# Patient Record
Sex: MALE | Race: ASIAN | NOT HISPANIC OR LATINO | Employment: PART TIME | URBAN - METROPOLITAN AREA
[De-identification: names, ages, dates, MRNs, and addresses within clinical notes are randomized per-mention and may not be internally consistent; named-entity substitution may affect disease eponyms.]

---

## 2017-03-15 ENCOUNTER — OFFICE VISIT - HEALTHEAST (OUTPATIENT)
Dept: FAMILY MEDICINE | Facility: CLINIC | Age: 32
End: 2017-03-15

## 2017-03-15 DIAGNOSIS — M54.50 LOW BACK PAIN: ICD-10-CM

## 2017-03-15 DIAGNOSIS — M41.26 OTHER IDIOPATHIC SCOLIOSIS, LUMBAR REGION: ICD-10-CM

## 2017-03-15 DIAGNOSIS — S43.201A STERNOCLAVICULAR JOINT SUBLUXATION, RIGHT, INITIAL ENCOUNTER: ICD-10-CM

## 2017-03-15 ASSESSMENT — MIFFLIN-ST. JEOR: SCORE: 1486.03

## 2018-01-31 ENCOUNTER — OFFICE VISIT - HEALTHEAST (OUTPATIENT)
Dept: FAMILY MEDICINE | Facility: CLINIC | Age: 33
End: 2018-01-31

## 2018-01-31 DIAGNOSIS — B35.6 TINEA CRURIS: ICD-10-CM

## 2018-09-14 ENCOUNTER — OFFICE VISIT - HEALTHEAST (OUTPATIENT)
Dept: FAMILY MEDICINE | Facility: CLINIC | Age: 33
End: 2018-09-14

## 2018-09-14 DIAGNOSIS — Z11.3 SCREEN FOR STD (SEXUALLY TRANSMITTED DISEASE): ICD-10-CM

## 2018-09-14 DIAGNOSIS — R05.9 COUGH: ICD-10-CM

## 2018-09-14 LAB
ALBUMIN UR-MCNC: NEGATIVE MG/DL
APPEARANCE UR: CLEAR
BACTERIA #/AREA URNS HPF: NORMAL HPF
BILIRUB UR QL STRIP: NEGATIVE
COLOR UR AUTO: YELLOW
GLUCOSE UR STRIP-MCNC: NEGATIVE MG/DL
HBV SURFACE AG SERPL QL IA: NEGATIVE
HGB UR QL STRIP: NEGATIVE
HIV 1+2 AB+HIV1 P24 AG SERPL QL IA: NEGATIVE
KETONES UR STRIP-MCNC: NEGATIVE MG/DL
LEUKOCYTE ESTERASE UR QL STRIP: NEGATIVE
NITRATE UR QL: NEGATIVE
PH UR STRIP: 6 [PH] (ref 5–8)
RBC #/AREA URNS AUTO: NORMAL HPF
SP GR UR STRIP: 1.02 (ref 1–1.03)
SQUAMOUS #/AREA URNS AUTO: NORMAL LPF
T PALLIDUM AB SER QL: NEGATIVE
UROBILINOGEN UR STRIP-ACNC: NORMAL
WBC #/AREA URNS AUTO: NORMAL HPF

## 2018-09-14 ASSESSMENT — MIFFLIN-ST. JEOR: SCORE: 1500.49

## 2018-09-17 LAB
C TRACH DNA SPEC QL PROBE+SIG AMP: NEGATIVE
N GONORRHOEA DNA SPEC QL NAA+PROBE: NEGATIVE
N GONORRHOEA DNA SPEC QL NAA+PROBE: NEGATIVE

## 2018-09-20 ENCOUNTER — COMMUNICATION - HEALTHEAST (OUTPATIENT)
Dept: FAMILY MEDICINE | Facility: CLINIC | Age: 33
End: 2018-09-20

## 2019-01-09 ENCOUNTER — OFFICE VISIT - HEALTHEAST (OUTPATIENT)
Dept: FAMILY MEDICINE | Facility: CLINIC | Age: 34
End: 2019-01-09

## 2019-01-09 DIAGNOSIS — Z23 NEED FOR VACCINATION: ICD-10-CM

## 2019-01-09 DIAGNOSIS — J02.9 SORETHROAT: ICD-10-CM

## 2019-01-09 DIAGNOSIS — J35.8 TONSILLOLITH: ICD-10-CM

## 2019-01-09 LAB — DEPRECATED S PYO AG THROAT QL EIA: NORMAL

## 2019-01-09 ASSESSMENT — MIFFLIN-ST. JEOR: SCORE: 1522.62

## 2019-01-10 LAB — GROUP A STREP BY PCR: NORMAL

## 2019-01-18 ENCOUNTER — AMBULATORY - HEALTHEAST (OUTPATIENT)
Dept: FAMILY MEDICINE | Facility: CLINIC | Age: 34
End: 2019-01-18

## 2019-01-24 ENCOUNTER — OFFICE VISIT - HEALTHEAST (OUTPATIENT)
Dept: FAMILY MEDICINE | Facility: CLINIC | Age: 34
End: 2019-01-24

## 2019-01-24 DIAGNOSIS — R05.9 COUGH: ICD-10-CM

## 2019-01-24 DIAGNOSIS — Z00.00 ANNUAL PHYSICAL EXAM: ICD-10-CM

## 2019-01-24 ASSESSMENT — MIFFLIN-ST. JEOR: SCORE: 1533.44

## 2019-04-01 ENCOUNTER — OFFICE VISIT - HEALTHEAST (OUTPATIENT)
Dept: FAMILY MEDICINE | Facility: CLINIC | Age: 34
End: 2019-04-01

## 2019-04-01 DIAGNOSIS — H11.001 PTERYGIUM EYE, RIGHT: ICD-10-CM

## 2019-04-01 DIAGNOSIS — M54.50 BILATERAL LOW BACK PAIN WITHOUT SCIATICA: ICD-10-CM

## 2019-04-01 RX ORDER — CYCLOBENZAPRINE HCL 5 MG
10 TABLET ORAL EVERY 8 HOURS PRN
Qty: 60 TABLET | Refills: 1 | Status: SHIPPED | OUTPATIENT
Start: 2019-04-01 | End: 2023-01-23

## 2019-04-01 RX ORDER — LIDOCAINE 50 MG/G
OINTMENT TOPICAL
Qty: 35.44 G | Refills: 2 | Status: SHIPPED | OUTPATIENT
Start: 2019-04-01 | End: 2023-01-23

## 2019-04-01 RX ORDER — IBUPROFEN 600 MG/1
600 TABLET, FILM COATED ORAL EVERY 6 HOURS PRN
Qty: 100 TABLET | Refills: 1 | Status: SHIPPED | OUTPATIENT
Start: 2019-04-01 | End: 2023-01-23

## 2019-04-01 ASSESSMENT — MIFFLIN-ST. JEOR: SCORE: 1541.38

## 2019-04-03 ENCOUNTER — COMMUNICATION - HEALTHEAST (OUTPATIENT)
Dept: FAMILY MEDICINE | Facility: CLINIC | Age: 34
End: 2019-04-03

## 2019-07-22 ENCOUNTER — OFFICE VISIT - HEALTHEAST (OUTPATIENT)
Dept: FAMILY MEDICINE | Facility: CLINIC | Age: 34
End: 2019-07-22

## 2019-07-22 DIAGNOSIS — H11.001 PTERYGIUM EYE, RIGHT: ICD-10-CM

## 2019-07-22 DIAGNOSIS — R09.A2 GLOBUS SENSATION: ICD-10-CM

## 2019-07-22 LAB — DEPRECATED S PYO AG THROAT QL EIA: NORMAL

## 2019-07-22 ASSESSMENT — MIFFLIN-ST. JEOR: SCORE: 1527.77

## 2019-07-23 LAB — GROUP A STREP BY PCR: NORMAL

## 2019-11-13 ENCOUNTER — COMMUNICATION - HEALTHEAST (OUTPATIENT)
Dept: FAMILY MEDICINE | Facility: CLINIC | Age: 34
End: 2019-11-13

## 2021-05-27 NOTE — TELEPHONE ENCOUNTER
[facility]    Medication Name: Lidocaine 5% Ointment    Insurance Plan: [facility]   PBM:    Patient ID Number: [id number]    Please start PA process

## 2021-05-27 NOTE — PROGRESS NOTES
ASSESMENT AND PLAN:  1. Bilateral low back pain without sciatica  - R>L.  Intermittent achy pain at 5-6/10 w/o radiation x 3 months. Pain worsen with crouching or standing long hours.  Massages helps. No red flags. Most consistent with Muscle sprain and spasms.  -  Encourage massages, warm packs and NSAIDs; take with food to avoid GI upset.  -  Follow up if symptoms not better or worsens.    Orders:    - ibuprofen (ADVIL,MOTRIN) 600 MG tablet; Take 1 tablet (600 mg total) by mouth every 6 (six) hours as needed for pain or fever.  Dispense: 100 tablet; Refill: 1   - lidocaine (XYLOCAINE) 5 % ointment; Apply to affected area 2 times day as needed.  Dispense: 35.44 g; Refill: 2   - cyclobenzaprine (FLEXERIL) 5 MG tablet; Take 2 tablets (10 mg total) by mouth every 8 (eight) hours as needed for muscle spasms.  Dispense: 60 tablet; Refill: 1    2. Pterygium eye, right  -  Not obscuring vision. Eyes feel dry and irritated after focusing for long periods.  -  Denies eye pain, itching, vision changes/loss.  -  Will continue to monitor.    Orders:   - hypromellose (ARTIFICIAL TEARS,[id number],) Drop; Instill 1-2 drops in each eye as needed for dry eyes or eye irritation.  Dispense: 30 mL; Refill: 11     Reviewed Medical/Social history and Medications.  See new changes above.   Discussed indications for emergent medical attention and routine F/u.  Patient/Parent/Guardian engaged in decision making process and verbalized understanding of the options discussed and agreed with the final treatment plan.     SUBJECTIVE:  Yogi Sol is a 33 y.o. male who presents  for evaluation of his Lower back pain x 3 months.     Right side seems worse than Left.  Pain is 5-6/10, intermittent,achy and dull and w/o radiation . Pain starts when crouching or standing for long periods.  Pt reports he does a lot of crouching at work and noticed his back started hurting.   He has tried Tylenol w/o much relief.  Massages helps with soreness and  "tight muscles. Pt also works out a lot and has done back exercises but not helping.     Denies fever/chills, paresthesia, caudal equina sxs.  Exam shows no vertebral tenderness.  Mild Right paravertebral tenderness but feels better when palpated. Most consistent with muscle strain and spasms.     ROS:  Comprehensive Review of Systems Negative except stated in HPI.     Past Medical History:   Diagnosis Date     Tonsillolith 1/9/2019    Left side, small.  Resolved on 1/9/19.      Patient Active Problem List   Diagnosis     Allergic Rhinitis     Insomnia     Major Depression, Single Episode     Lower Back Pain     Costochondritis (Tietze's Syndrome)     Current Outpatient Medications   Medication Sig Dispense Refill     acetaminophen (PAIN AND FEVER) 500 MG tablet Take 2 tablets (1,000 mg total) by mouth every 6 (six) hours as needed for pain. Take 2 tablets by mouth 4 times daily as needed 60 tablet 11     cyclobenzaprine (FLEXERIL) 5 MG tablet Take 2 tablets (10 mg total) by mouth every 8 (eight) hours as needed for muscle spasms. 60 tablet 1     fluticasone (FLONASE) 50 mcg/actuation nasal spray 2 sprays into each nostril daily.       hypromellose (ARTIFICIAL TEARS,[id number],) Drop Instill 1-2 drops in each eye as needed for dry eyes or eye irritation. 30 mL 11     ibuprofen (ADVIL,MOTRIN) 600 MG tablet Take 1 tablet (600 mg total) by mouth every 6 (six) hours as needed for pain or fever. 100 tablet 1     lidocaine (XYLOCAINE) 5 % ointment Apply to affected area 2 times day as needed. 35.44 g 2     multivit-iron-min-FA-lutein 18-0.4-250 mg-mg-mcg Tab Take 1 tablet by mouth daily. 30 tablet 11     No current facility-administered medications for this visit.      Social History     Tobacco Use   Smoking Status Never Smoker   Smokeless Tobacco Never Used     OBJECTIVE: /72   Pulse 74   Temp 97.7  F (36.5  C) (Oral)   Resp 18   Ht 5' 6.2\" (1.681 m)   Wt 144 lb 8 oz (65.5 kg)   SpO2 98%   BMI 23.18 kg/m   "   No results found for this or any previous visit (from the past 24 hour(s)).    PHYSICAL:  General Alert, awake, not in acute distress.   HEENT:             -Head Atraumatic, normocephalic.            -Eyes PERRL, no erythema, discharge, conjunctiva clear. Mild medial pterygium of right eye. No obstruction.            -Ears TMs intact, no drainage, no erythema or edema.            -Nose    Nostrils patent,  no edema.            -Throat Oropharynx without edema, erythema.  Uvula midline, no deviation.             -Neck Neck FROM, no adenopathy.  Thyroid not visibly enlarged.   CV Normal S1 & S2. No murmurs.   RESP Non-labored, RRR, CTAB. No wheezes or crackles.    ABDOMEN Soft,non-tender. No rigidity, guarding.  Normal bowel sounds.    BACK No vertebral tenderness, step offs. Normal curvature.  Mild Right paravertebral tenderness; better with palpation.        Hua Rubio PA-C

## 2021-05-30 VITALS — HEIGHT: 66 IN | BODY MASS INDEX: 21.38 KG/M2 | WEIGHT: 133 LBS

## 2021-05-30 NOTE — PROGRESS NOTES
ASSESMENT AND PLAN:  1. Globus sensation  -  Reports globus sensation which started 2 weeks ago. Denies pain or dysphagia.   -  Rapid Strep: Negative; results given.   -  Follow up if symptoms not better or worsens.  May consider referral to ENT or GI if not resolved.    Orders:   - Rapid Strep A Screen- Throat Swab   - phenol-phenolate sodium (CHLORASEPTIC) 1.4 % SprA; Apply 2 sprays to the mouth or throat every 4 (four) hours as needed.  Dispense: 177 mL; Refill: 0  - Group A Strep, RNA Direct Detection, Throat    2. Pterygium eye, right  - Controlled on current tx. Refill request.   Orders:   - hypromellose (ARTIFICIAL TEARS,[id number],) Drop; Instill 1-2 drops in each eye as needed for dry eyes or eye irritation.  Dispense: 30 mL; Refill: 11     Reviewed Medical/Social history and Medications. See new changes above.   Discussed indications for emergent medical attention and routine F/u.  Patient/Parent/Guardian engaged in decision making process and verbalized understanding of the options discussed and agreed with the final treatment plan.     SUBJECTIVE:  Yogi Sol is a 33 y.o. male who presents  for evaluation of his throat problem.     Reports sensation of food  stuck in back of throat x 2 weeks.  Denies dysphagia or pain,  fever/chills, wheezing, SOB, n/v, cough, rhinorrhea.    ROS:  Comprehensive Review of Systems Negative except stated in HPI.     Past Medical History:   Diagnosis Date     Tonsillolith 1/9/2019    Left side, small.  Resolved on 1/9/19.      Patient Active Problem List   Diagnosis     Allergic Rhinitis     Insomnia     Major Depression, Single Episode     Lower Back Pain     Costochondritis (Tietze's Syndrome)     Current Outpatient Medications   Medication Sig Dispense Refill     hypromellose (ARTIFICIAL TEARS,[id number],) Drop Instill 1-2 drops in each eye as needed for dry eyes or eye irritation. 30 mL 11     acetaminophen (PAIN AND FEVER) 500 MG tablet Take 2 tablets (1,000 mg  "total) by mouth every 6 (six) hours as needed for pain. Take 2 tablets by mouth 4 times daily as needed 60 tablet 11     cyclobenzaprine (FLEXERIL) 5 MG tablet Take 2 tablets (10 mg total) by mouth every 8 (eight) hours as needed for muscle spasms. (Patient not taking: Reported on 7/22/2019      ) 60 tablet 1     fluticasone (FLONASE) 50 mcg/actuation nasal spray 2 sprays into each nostril daily.              ibuprofen (ADVIL,MOTRIN) 600 MG tablet Take 1 tablet (600 mg total) by mouth every 6 (six) hours as needed for pain or fever. 100 tablet 1     lidocaine (XYLOCAINE) 5 % ointment Apply to affected area 2 times day as needed. (Patient not taking: Reported on 7/22/2019      ) 35.44 g 2     multivit-iron-min-FA-lutein 18-0.4-250 mg-mg-mcg Tab Take 1 tablet by mouth daily. 30 tablet 11     phenol-phenolate sodium (CHLORASEPTIC) 1.4 % SprA Apply 2 sprays to the mouth or throat every 4 (four) hours as needed. 177 mL 0     No current facility-administered medications for this visit.      Social History     Tobacco Use   Smoking Status Never Smoker   Smokeless Tobacco Never Used     OBJECTIVE: /72   Pulse 98   Temp 98.3  F (36.8  C) (Oral)   Resp 16   Ht 5' 6.2\" (1.681 m)   Wt 141 lb 8 oz (64.2 kg)   SpO2 97%   BMI 22.70 kg/m     No results found for this or any previous visit (from the past 24 hour(s)).    PHYSICAL:  General Alert, awake, not in acute distress.   HEENT:             -Head Atraumatic, normocephalic.            -Eyes PERRL, no erythema, discharge, conjunctiva clear.             -Ears TMs intact, no drainage, no erythema or edema.            -Nose    Nostrils patent,  no edema.            -Throat Oropharynx without edema, erythema.  Uvula midline, no deviation.             -Neck Neck FROM, no adenopathy.  Thyroid not visibly enlarged.   CV Normal S1 & S2. No murmurs.   RESP Non-labored, RRR, CTAB. No wheezes or crackles.        Hua Rubio PA-C         "

## 2021-06-01 VITALS — BODY MASS INDEX: 21.19 KG/M2 | WEIGHT: 131.31 LBS

## 2021-06-02 VITALS — BODY MASS INDEX: 22.68 KG/M2 | WEIGHT: 141.12 LBS | HEIGHT: 66 IN

## 2021-06-02 VITALS — HEIGHT: 66 IN | WEIGHT: 142.75 LBS | BODY MASS INDEX: 22.94 KG/M2

## 2021-06-02 VITALS — HEIGHT: 66 IN | WEIGHT: 144.5 LBS | BODY MASS INDEX: 23.22 KG/M2

## 2021-06-02 VITALS — BODY MASS INDEX: 21.89 KG/M2 | WEIGHT: 136.19 LBS | HEIGHT: 66 IN

## 2021-06-03 VITALS — HEIGHT: 66 IN | WEIGHT: 141.5 LBS | BODY MASS INDEX: 22.74 KG/M2

## 2021-06-09 NOTE — PROGRESS NOTES
Subjective:   Yogi comes in today with 2 concerns.  He's noticed a swelling in the upper right chest around the area of the clavicle.  He does not remember any injury.  He does do a lot of lifting and working out however.  The area does not really pain him at all.  It does not affect anything he schedules her does.  He denies trauma to the area.  He's also been having some back pain.  He gets low back pain occasionally.  Is also noticed a little bit of a swelling over the left side of the low back.  The area of swelling seems to come and go and does not really bother him that much.  He denies leg pain.  He denies leg weakness.      Objective:  Chest: There is a swelling noted over the medial aspect of the right clavicle.  This is at the sternoclavicular joint area.  This area is nontender.  It is more prominent than the left side.  No erythema noted.  No crepitus present.  No ecchymosis noted.  Patient has normal range of motion of shoulder.  Back: There is mild swelling noted over the left lumbar musculature.  Of note is there is a lumbar scoliosis as well.  There is a pelvic tilt is noted.  Left side of the pelvis is doubly higher than the right.  Straight leg raising today is negative.  Deep tendon reflexes in lower extremity are within normal limits.      Assessment:  1.  Mild sternoclavicular joint subluxation.  Possibly brought on by weight lifting  2.  Lumbar scoliosis with pelvic tilt and mild swelling in left lumbar musculature      Plan:  The patient will continue muscle toning.  He will continue core exercises to help with back pain.  He continues Tylenol or ibuprofen for pain control.  He will continue weight lifting but I instructed him to limit weight to 80  so that the sternoclavicular subluxation does not worsen.  He can try to see if a chiropractic treatment may help the symptom that he has.  Otherwise follow-up here only as needed.

## 2021-06-15 NOTE — PROGRESS NOTES
ASSESSMENT and plan  1. Tinea cruris  A involvement of the skin in the groin is present on the left portion of the gland and extends below the left testicle.  The rectum was not involved totaling of involvement is 4 x 3 cm signs of induration are present.  Instructed on using antifungal cream.  If symptoms worsen he needs to come back for follow-up who is here because he has noticed a skin rash in the left side was going for the last 3-4 days it is very itching and describes it being hot            There are no Patient Instructions on file for this visit.    No orders of the defined types were placed in this encounter.    There are no discontinued medications.    No Follow-up on file.    CHIEF COMPLAINT:  Chief Complaint   Patient presents with     other     possible rash on groin area       HISTORY OF PRESENT ILLNESS:  Yogi is a 32 y.o. male they have used some local cream which has not helped.  He has had this rash before but forgets how he treated it.  He works out every day and thinks it might be exacerbated by exercise and sweating.    REVIEW OF SYSTEMS:   General negative for fever chills   denies symptoms  Skin positive for rash and left-sided groin as mentioned in HPI  All other systems are negative.    PFSH:      TOBACCO USE:  History   Smoking Status     Never Smoker   Smokeless Tobacco     Never Used       VITALS:  Vitals:    01/31/18 1151   BP: 124/78   Patient Site: Left Arm   Patient Position: Sitting   Cuff Size: Adult Regular   Pulse: 88   Temp: 97.9  F (36.6  C)   TempSrc: Oral   SpO2: 96%   Weight: 131 lb 5 oz (59.6 kg)     Wt Readings from Last 3 Encounters:   01/31/18 131 lb 5 oz (59.6 kg)   03/15/17 133 lb (60.3 kg)   10/04/16 132 lb 8 oz (60.1 kg)       PHYSICAL EXAM:  Interactive  American male sitting comfortably exam room no acute distress   no inguinal lymph enlargement noted no masses detected in the groin  Skin erythematous indurated rash measuring 4 x 8 cm located in the left  pleural space.    DATA REVIEWED:  Additional History from Old Records Summarized (2): 2 reviewed last clinic visit with Dr. Negron  Decision to Obtain Records (1): 0  Radiology Tests Summarized or Ordered (1): 0  Labs Reviewed or Ordered (1): 0  Medicine Test Summarized or Ordered (1): 0  Independent Review of EKG or X-RAY(2 each): 0    The visit lasted a total of 12 minutes face to face with the patient. Over 50% of the time was spent counseling and educating the patient about tinea cruris.    MEDICATIONS:  Current Outpatient Prescriptions   Medication Sig Dispense Refill     acetaminophen (PAIN AND FEVER) 500 MG tablet Take 2 tablets (1,000 mg total) by mouth every 6 (six) hours as needed for pain. Take 2 tablets by mouth 4 times daily as needed 60 tablet 11     fluticasone (FLONASE) 50 mcg/actuation nasal spray 2 sprays into each nostril daily.       hypromellose (ARTIFICIAL TEARS,[id number],) Drop Instill 1-2 drops in each eye as needed for dry eyes or eye irritation. 30 mL 11     lidocaine (XYLOCAINE) 2 % jelly Apply topically as needed. 30 mL 6     multivit-iron-min-FA-lutein 18-0.4-250 mg-mg-mcg Tab Take 1 tablet by mouth daily. 30 tablet 11     tolnaftate (TINACTIN/LAMISIL AF DEFENSE) 1 % cream Apply to affected area twice daily 30 g 2     No current facility-administered medications for this visit.

## 2021-06-16 NOTE — TELEPHONE ENCOUNTER
Telephone Encounter by Mirna Anguiano at 4/4/2019  3:37 PM     Author: Mirna Anguiano Service: -- Author Type: --    Filed: 4/4/2019  3:41 PM Encounter Date: 4/3/2019 Status: Signed    : Mirna Anguiano       PRIOR AUTHORIZATION DENIED    Denial Rational: Medication is not covered for diagnosis.  It is only covered for: nesthesia of accessible mucous membranes of the oropharynx, Anesthetic lubricant for intubation,Temporary relief of pain associated with minor burns, including sunburn, abrasions of the skin, and insect bites                  Appeal Information: If provider would like to appeal please provide a letter of medical necessity and route back to the PA team.

## 2021-06-20 NOTE — LETTER
Letter by Perla Negron MD at      Author: Perla Negron MD Service: -- Author Type: --    Filed:  Encounter Date: 11/13/2019 Status: Signed       January 9, 2020    Dear Yogi Sol:    On review of your records, we have found a gap in your health care services.  Based on your age and health history, we recommend the following service/s:  - Immunization/s  -   Please call us at [phone] to make an appointment for the above service/s.  If you had had the service done elsewhere, please let us know so that we can update our records.  If you have transferred care to another clinic, please let us know so that we can remove you from our list of current patients.   We believe that a strong preventive health program that includes regular visits and follow up care is an important part of your health and are committed to assisting you in being as healthy as you can be.     Sincerely,     [facility]

## 2021-06-20 NOTE — PROGRESS NOTES
ASSESSMENT and plan  1. Screen for STD (sexually transmitted disease)    He was recently in [facility] [place] 2[place] and had protected and unprotected sex and more than one occasion.  His wife wants him to have screening test before engage in intercourse again he notes no symptoms.  - HIV Antigen/Antibody Screening [place]  - RPR  - Hepatitis B Surface antigen (HBsAG)  - Urinalysis Microscopic  - Chlamydia trachomatis, DNA, amp probe  - Gonococcus DNA, PCR    2. Cough    Is noted a cough since he got back.  No loss of weight no fever no chills he suffers from allergies in the past denies any rhinitis.  Respiratory exam is unremarkable suggested trying a over-the-counter antihistamine if symptoms persist he should come back for follow-up.              There are no Patient Instructions on file for this visit.    Orders Placed This Encounter   Procedures     Chlamydia trachomatis, DNA, amp probe     Order Specific Question:   Specimen Source?     Answer:   Urine     Gonococcus DNA, PCR     Order Specific Question:   Specimen Source?     Answer:   Urine     HIV Antigen/Antibody Screening [place]     RPR     Hepatitis B Surface antigen (HBsAG)     Urinalysis Microscopic     There are no discontinued medications.    No Follow-up on file.    CHIEF COMPLAINT:  Chief Complaint   Patient presents with     STI Check       HISTORY OF PRESENT ILLNESS:  Yogi is a 32 y.o. male     Is here for an evaluation of cough and for an STD check.  He was recently in [facility] [place]note 2[place] and had unprotected unprotected sex with more than 1 partner.  He has not noticed any symptoms when he got back in this regard.  His wife wants him to have testing done before she has intercourse with him.  He has never had sexually transmitted infection diagnosed.    He has had a dry cough for more than 2 weeks.  More prevalent during the night no associated shortness of breath or wheezing or rhinitis.  No relieving or aggravating factors noted he thinks he might have got it while in  "[place] because of exposure to dust    REVIEW OF SYSTEMS:     Pulmonary cough noted  All other 10 point review of systems are negative.    PFSH:    Reviewed    TOBACCO USE:  History   Smoking Status     Never Smoker   Smokeless Tobacco     Never Used       VITALS:  Vitals:    09/14/18 1114   BP: 124/82   Pulse: 86   Resp: 18   Temp: 97.6  F (36.4  C)   TempSrc: Oral   SpO2: 97%   Weight: 136 lb 3 oz (61.8 kg)   Height: 5' 6\" (1.676 m)     Wt Readings from Last 3 Encounters:   09/14/18 136 lb 3 oz (61.8 kg)   01/31/18 131 lb 5 oz (59.6 kg)   03/15/17 133 lb (60.3 kg)       PHYSICAL EXAM:    Interactive  American male sitting in exam room in no acute distress  HEENT neck supple mucous membranes moist TMs clear nasal passages are patent there is no sinus tenderness  Respiratory system clear to auscultation equal breath sounds good inspiratory effort no wheezes or crackles    CVS regular rate and rhythm no murmurs rubs gallops appreciated peripheral pulses are good  Abdomen soft there is no focal tenderness bowel sounds are present no hepatosplenomegaly     DATA REVIEWED:  Additional History from Old Records Summarized (2): 0  Decision to Obtain Records (1): 0  Radiology Tests Summarized or Ordered (1): 0  Labs Reviewed or Ordered (1):     Labs ordered for STD check  Medicine Test Summarized or Ordered (1): 0  Independent Review of EKG or X-RAY(2 each): 0    The visit lasted a total of 25 minutes face to face with the patient. Over 50% of the time was spent counseling and educating the patient about   STD prevention cough, seasonal allergies,.    MEDICATIONS:  Current Outpatient Prescriptions   Medication Sig Dispense Refill     acetaminophen (PAIN AND FEVER) 500 MG tablet Take 2 tablets (1,000 mg total) by mouth every 6 (six) hours as needed for pain. Take 2 tablets by mouth 4 times daily as needed 60 tablet 11     fluticasone (FLONASE) 50 mcg/actuation nasal spray 2 sprays into each nostril daily.       " hypromellose (ARTIFICIAL TEARS,[id number],) Drop Instill 1-2 drops in each eye as needed for dry eyes or eye irritation. 30 mL 11     lidocaine (XYLOCAINE) 2 % jelly Apply topically as needed. 30 mL 6     multivit-iron-min-FA-lutein 18-0.4-250 mg-mg-mcg Tab Take 1 tablet by mouth daily. 30 tablet 11     tolnaftate (TINACTIN/LAMISIL AF DEFENSE) 1 % cream Apply to affected area twice daily 30 g 2     No current facility-administered medications for this visit.      Please note that this clinical encounter uses voice recognition software, there may be typographical errors present

## 2021-06-22 NOTE — PROGRESS NOTES
"ASSESMENT AND PLAN:  Diagnoses and all orders for this visit:    1.  Tonsillolith  -  Pt with sore throat (see below), complaints of globus sensation x 4-5 months,  and white solid debris on Left palatine tonsil.  Denies fever/chills, dysphagia, odynophagia, n/v.   -  Small 2-3 mm tonsillolith was expelled from tonsil using swab; Pt tolerated procedure well.   Pt feels much better and globus sensation resolved.  -  RTC if new sxs appear.   -   Supportive tx recommended.   Ordered:  -     ibuprofen (ADVIL,MOTRIN) 600 MG tablet  Dispense: 100 tablet; Refill: 1  -     phenol-phenolate sodium (CHLORASEPTIC) 1.4 % SprA  Dispense: 177 mL; Refill: 0    2. Sore throat  -  Four to five months of sore throat and globus sensation (see above).  Exam unremarkable, except for tonsillolith as noted above.   -  Rapid strep: NEGATIVE.    -  Follow up if symptoms not better or worsens.    Ordered:  -     Rapid Strep A Screen- Throat Swab  -     Group A Strep, RNA Direct Detection, Throat    3.  Need for vaccination  -  Pt decline Influenza vaccine.  -  Discussed risks and benefits.    Ordered:  -     Td, Preservative Free (green label)    4. Health maintenance  -  Pt has not have physical. F/u in 2-3 weeks.    Reviewed Medical/Social history and Medications.  See new changes above.   Discussed indications for emergent medical attention and routine F/u.  Patient/Parent/Guardian engaged in decision making process and verbalized understanding of the options discussed and agreed with the final treatment plan.     SUBJECTIVE:  Yogi Sol is a 33 y.o. male who present globus sensation and sore throat x 4- months.    \"Feels like food is getting caught in my throat when I swallow.\"  Exam shows small white solid debris on Left palatine tonsil, which is consistent with Tonsillolith.  Cotton swab was used to push on tonsil and tonsillolith was expelled. Pt tolerated procedure well and states globus sensation is gone however still has sore " "throat.  Strept test negative and supportive tx recommended.     Denies odynophagia, dysphagia, fever/chills, wheezing, SOB, n/v, abdominal pain.     ROS:  Comprehensive Review of Systems Negative except stated in HPI.     No past medical history on file.  Patient Active Problem List   Diagnosis     Allergic Rhinitis     Insomnia     Major Depression, Single Episode     Lower Back Pain     Costochondritis (Tietze's Syndrome)     Current Outpatient Medications   Medication Sig Dispense Refill     acetaminophen (PAIN AND FEVER) 500 MG tablet Take 2 tablets (1,000 mg total) by mouth every 6 (six) hours as needed for pain. Take 2 tablets by mouth 4 times daily as needed 60 tablet 11     fluticasone (FLONASE) 50 mcg/actuation nasal spray 2 sprays into each nostril daily.       hypromellose (ARTIFICIAL TEARS,[id number],) Drop Instill 1-2 drops in each eye as needed for dry eyes or eye irritation. 30 mL 11     ibuprofen (ADVIL,MOTRIN) 600 MG tablet Take 1 tablet (600 mg total) by mouth every 6 (six) hours as needed for pain or fever. 100 tablet 1     lidocaine (XYLOCAINE) 2 % jelly Apply topically as needed. 30 mL 6     multivit-iron-min-FA-lutein 18-0.4-250 mg-mg-mcg Tab Take 1 tablet by mouth daily. 30 tablet 11     phenol-phenolate sodium (CHLORASEPTIC) 1.4 % SprA Apply 2 sprays to the mouth or throat every 6 (six) hours as needed. 177 mL 0     tolnaftate (TINACTIN/LAMISIL AF DEFENSE) 1 % cream Apply to affected area twice daily 30 g 2     No current facility-administered medications for this visit.      Social History     Tobacco Use   Smoking Status Never Smoker   Smokeless Tobacco Never Used     OBJECTIVE: /80   Pulse 62   Temp 97.7  F (36.5  C) (Oral)   Resp 14   Ht 5' 5.98\" (1.676 m)   Wt 141 lb 1.9 oz (64 kg)   SpO2 98%   BMI 22.79 kg/m     Recent Results (from the past 24 hour(s))   Rapid Strep A Screen- Throat Swab    Collection Time: 01/09/19  9:04 AM   Result Value Ref Range    Rapid Strep A " Antigen No Group A Strep detected, presumptive negative No Group A Strep detected, presumptive negative     PHYSICAL:  General Alert, awake, not in acute distress.   HEENT:             -Head Atraumatic, normocephalic.            -Eyes PERRL, no erythema, discharge, conjunctiva clear.             -Ears TMs intact, no drainage, no erythema or edema.            -Nose    Nostrils patent,  no edema.            -Throat Left Stella tonsil with white solid debris, 2-3 mm. Oropharynx without edema, erythema.  Uvula midline, no deviation.             -Neck Neck FROM, no adenopathy.  Thyroid not visibly enlarged.   CV Normal S1 & S2. No murmurs.   RESP Non-labored, RRR, CTAB. No wheezes or crackles.        Hua Rubio PA-C

## 2021-06-23 NOTE — PROGRESS NOTES
MALE PREVENTATIVE EXAM    Assessment and Plan:     1.  Annual Physical Exam  -   Healthy Exam; no concerns except for 2 weeks of productive cough (see below)  -  Denies new or worsening sxs.  Reports Globus sensation resolved after tonsillolith was removed from last OV.  -  F/u annually or sooner as needed.     2.  Cough  -  2 weeks of productive cough.  Denies rhinorrhea, fever/chils, shortness of breath/wheezing, itchy or painful throat, hemoptysis.   -  Follow up if symptoms not better or worsens.    Ordered:   -  Dextromethorphan-guaifenesin  mg/5mL.  Take 5 mL every 4 hours PRN. #120mL, R1.     Next follow up:  Return in about 1 year (around 1/24/2020) for Annual Physical.    Immunization Review  Adult Imm Review: Decline Flu shot today.    Has not ever used Tobacco.  Drinks alcohol 4-5 beers occasionally on weekends.  Encourage to reduce/avoid to prevent future problems.  I discussed the following with the patient:   -Adult Healthy Living: Importance of regular exercise  -Healthy nutrition  -Stress management  -Pt is at home and unemployed.  Encouraged Pt to pursue education and career  given there are many opportunities and help available.  Pt will consider.   - Advised to get Dental cleaning/checkup every 6 months.     Subjective:   Chief Complaint: Yogi Sol is an 33 y.o. male here for a preventative health visit.  He has no health concerns at this time except for mild productive cough x 2 weeks.  Denies fevers/chills, shortness of breath/wheezing, rhinorrhea, sore throat, n/v, abdominal pain, hemoptysis, dysuria, flank pain, hematuria. Pt reports daily exercise and eating healthy.     Healthy Habits  Are you taking a daily aspirin? No  Do you typically exercising at least 40 min, 3-4 times per week?  Yes  Do you usually eat at least 4 servings of fruit and vegetables a day, include whole grains and fiber and avoid regularly eating high fat foods? Yes  Have you had an eye exam in the past two years?  "NO  Do you see a dentist twice per year? NO;   Do you have any concerns regarding sleep? No    Safety Screen  If you own firearms, are they secured in a locked gun cabinet or with trigger locks? Yes  Do you feel you are safe where you are living?: Yes (1/24/2019  1:18 PM)  Do you feel you are safe in your relationship(s)?: Yes (1/24/2019  1:18 PM)    Review of Systems:  Please see above.  The rest of the review of systems are negative for all systems.     Cancer Screening     Patient has no health maintenance due at this time        Patient Care Team:  Perla Negron MD as PCP - General  Hua Rubio PA-C    History     Reviewed By Date/Time Sections Reviewed    Tania Graves CMA 1/24/2019  1:20 PM Tobacco        Objective:   Vital Signs:   Visit Vitals  /74   Pulse 86   Temp 97.6  F (36.4  C) (Oral)   Resp 18   Ht 5' 6.2\" (1.681 m)   Wt 142 lb 12 oz (64.8 kg)   SpO2 99%   BMI 22.90 kg/m       Physical Exam  General Appearance: Alert, cooperative, no distress, appears stated age  Head - Normal, atraumatic. No obvious deformity/abnormality.  Eyes-PERRL, EOM intact, conjunctivae clear.   ENT-tympanic membranes are clear bilaterally.  Oropharynx is clear.  Neck-no palpable mass or lymphadenopathy. Supple, symmetrical, trachea midline, no adenopathy;  thyroid: not enlarged,  CV-regular rate and rhythm with no murmur, femoral pulses palpable.  Respiratory-lungs clear to auscultation bilaterally.  Abdomen-soft, nontender, no palpable masses or organomegaly. No hernia.   Genitourinary-descended testes bilaterally normal to palpation, normal penis. No hernias.   Extremities-warm,  no edema.  Neurologic-Grossly intact; no focal deficits.  Strength and sensation are symmetric. Normal patellar DTR's, normal gait  Skin- Difficult to fully assess due to multiple tattoos covering entire upper extremities, back, and partially on chest area.  Texture, turgor normal.   Musculoskeletal-negative scoliosis " screen.              Additional Screenings Completed Today:

## 2023-01-23 ENCOUNTER — ANCILLARY PROCEDURE (OUTPATIENT)
Dept: GENERAL RADIOLOGY | Facility: CLINIC | Age: 38
End: 2023-01-23
Attending: FAMILY MEDICINE
Payer: COMMERCIAL

## 2023-01-23 ENCOUNTER — OFFICE VISIT (OUTPATIENT)
Dept: FAMILY MEDICINE | Facility: CLINIC | Age: 38
End: 2023-01-23
Payer: COMMERCIAL

## 2023-01-23 VITALS
DIASTOLIC BLOOD PRESSURE: 68 MMHG | WEIGHT: 150.25 LBS | TEMPERATURE: 98 F | RESPIRATION RATE: 16 BRPM | HEART RATE: 66 BPM | OXYGEN SATURATION: 98 % | HEIGHT: 66 IN | SYSTOLIC BLOOD PRESSURE: 120 MMHG | BODY MASS INDEX: 24.15 KG/M2

## 2023-01-23 DIAGNOSIS — M65.352 TRIGGER LITTLE FINGER OF LEFT HAND: ICD-10-CM

## 2023-01-23 DIAGNOSIS — R20.0 HAND NUMBNESS: ICD-10-CM

## 2023-01-23 DIAGNOSIS — M77.11 RIGHT LATERAL EPICONDYLITIS: Primary | ICD-10-CM

## 2023-01-23 DIAGNOSIS — Z13.220 SCREENING FOR HYPERLIPIDEMIA: ICD-10-CM

## 2023-01-23 LAB
BASOPHILS # BLD AUTO: 0 10E3/UL (ref 0–0.2)
BASOPHILS NFR BLD AUTO: 1 %
CHOLEST SERPL-MCNC: 223 MG/DL
EOSINOPHIL # BLD AUTO: 0.2 10E3/UL (ref 0–0.7)
EOSINOPHIL NFR BLD AUTO: 4 %
ERYTHROCYTE [DISTWIDTH] IN BLOOD BY AUTOMATED COUNT: 11.7 % (ref 10–15)
HBA1C MFR BLD: 5.7 % (ref 0–5.6)
HCT VFR BLD AUTO: 46.7 % (ref 40–53)
HDLC SERPL-MCNC: 54 MG/DL
HGB BLD-MCNC: 15.7 G/DL (ref 13.3–17.7)
IMM GRANULOCYTES # BLD: 0 10E3/UL
IMM GRANULOCYTES NFR BLD: 0 %
LDLC SERPL CALC-MCNC: 140 MG/DL
LYMPHOCYTES # BLD AUTO: 1.8 10E3/UL (ref 0.8–5.3)
LYMPHOCYTES NFR BLD AUTO: 37 %
MCH RBC QN AUTO: 28.4 PG (ref 26.5–33)
MCHC RBC AUTO-ENTMCNC: 33.6 G/DL (ref 31.5–36.5)
MCV RBC AUTO: 84 FL (ref 78–100)
MONOCYTES # BLD AUTO: 0.3 10E3/UL (ref 0–1.3)
MONOCYTES NFR BLD AUTO: 7 %
NEUTROPHILS # BLD AUTO: 2.5 10E3/UL (ref 1.6–8.3)
NEUTROPHILS NFR BLD AUTO: 52 %
NONHDLC SERPL-MCNC: 169 MG/DL
PLATELET # BLD AUTO: 178 10E3/UL (ref 150–450)
RBC # BLD AUTO: 5.53 10E6/UL (ref 4.4–5.9)
TRIGL SERPL-MCNC: 144 MG/DL
TSH SERPL DL<=0.005 MIU/L-ACNC: 3 UIU/ML (ref 0.3–4.2)
VIT B12 SERPL-MCNC: 349 PG/ML (ref 232–1245)
WBC # BLD AUTO: 4.9 10E3/UL (ref 4–11)

## 2023-01-23 PROCEDURE — 73130 X-RAY EXAM OF HAND: CPT | Mod: TC | Performed by: RADIOLOGY

## 2023-01-23 PROCEDURE — 85025 COMPLETE CBC W/AUTO DIFF WBC: CPT | Performed by: FAMILY MEDICINE

## 2023-01-23 PROCEDURE — 84443 ASSAY THYROID STIM HORMONE: CPT | Performed by: FAMILY MEDICINE

## 2023-01-23 PROCEDURE — 36415 COLL VENOUS BLD VENIPUNCTURE: CPT | Performed by: FAMILY MEDICINE

## 2023-01-23 PROCEDURE — 90686 IIV4 VACC NO PRSV 0.5 ML IM: CPT | Performed by: FAMILY MEDICINE

## 2023-01-23 PROCEDURE — 80061 LIPID PANEL: CPT | Performed by: FAMILY MEDICINE

## 2023-01-23 PROCEDURE — 99204 OFFICE O/P NEW MOD 45 MIN: CPT | Mod: 25 | Performed by: FAMILY MEDICINE

## 2023-01-23 PROCEDURE — 90471 IMMUNIZATION ADMIN: CPT | Performed by: FAMILY MEDICINE

## 2023-01-23 PROCEDURE — 82607 VITAMIN B-12: CPT | Performed by: FAMILY MEDICINE

## 2023-01-23 PROCEDURE — 83036 HEMOGLOBIN GLYCOSYLATED A1C: CPT | Performed by: FAMILY MEDICINE

## 2023-01-23 RX ORDER — IBUPROFEN 600 MG/1
600 TABLET, FILM COATED ORAL 3 TIMES DAILY
Qty: 21 TABLET | Refills: 0 | Status: SHIPPED | OUTPATIENT
Start: 2023-01-23 | End: 2023-01-30

## 2023-01-23 ASSESSMENT — PATIENT HEALTH QUESTIONNAIRE - PHQ9
SUM OF ALL RESPONSES TO PHQ QUESTIONS 1-9: 21
10. IF YOU CHECKED OFF ANY PROBLEMS, HOW DIFFICULT HAVE THESE PROBLEMS MADE IT FOR YOU TO DO YOUR WORK, TAKE CARE OF THINGS AT HOME, OR GET ALONG WITH OTHER PEOPLE: VERY DIFFICULT
SUM OF ALL RESPONSES TO PHQ QUESTIONS 1-9: 21

## 2023-01-23 NOTE — PATIENT INSTRUCTIONS
"For elbow - ibuprofen as prescribed. Physical therapy if not better    For numbness - labs today    For left little finger - hand xray today and hand specialist referral (orthopedics) for \"trigger finger\"    Follow up here in 2-4 weeks to see how everything is going      "

## 2023-01-24 ENCOUNTER — TELEPHONE (OUTPATIENT)
Dept: FAMILY MEDICINE | Facility: CLINIC | Age: 38
End: 2023-01-24
Payer: COMMERCIAL

## 2023-01-24 NOTE — TELEPHONE ENCOUNTER
Called pt in an attempt to relay message. Could not reach pt and could not leave VM either as VM box was full.    Writer will try again another time.    Ovidio Marroquin Cem Say, BSN RN  [facility]        ----- Message from Katalina De La Cruz MD sent at 1/24/2023 10:51 AM CST -----  Hand xray is normal. Next step for trigger finger is to see orthopedics as referred at clinic visit yesterday. Blood tests do not show a cause for the numbness. Try the ibuprofen as prescribed for the elbow, and see if this also helps the numbness. If it does not get better, return and we can order additional testing. Blood tests show high cholesterol and borderline blood sugar, meaning he is at increased risk of developing diabetes. Exercise daily, and avoid having too much bread, rice, noodles, and sweets, and check blood tests at least once a year.

## 2023-01-24 NOTE — LETTER
"January 27, 2023      Yogi Sol  [address] [place] [address]        Dear Yogi,     We have been trying to contact you regarding your recent X-ray results. We were unable to reach you on three separate occasions. Below is the provider's message:    \"Hand xray is normal. Next step for trigger finger is to see orthopedics as referred at clinic visit yesterday. Blood tests do not show a cause for the numbness. Try the ibuprofen as prescribed for the elbow, and see if this also helps the numbness. If it does not get better, return and we can order additional testing. Blood tests show high cholesterol and borderline blood sugar, meaning he is at increased risk of developing diabetes. Exercise daily, and avoid having too much bread, rice, noodles, and sweets, and check blood tests at least once a year. \"    If you have any question regarding this letter, please call the clinic number above.    Sincerely,        Katalina De La Cruz MD          "

## 2023-01-25 NOTE — TELEPHONE ENCOUNTER
Writer called patient with the help of a mar  regarding provider's message below. No answer, left non-detailed VM with call back number.    If pt calls back, please relay Dr. De La Cruz's message to patient. Thanks.    MO WatkinsN, RN   [facility]

## 2023-01-26 NOTE — TELEPHONE ENCOUNTER
Called pt with assistance from  x 2. Left VM to call back.    If pt calls back, please relay Dr. De La Cruz's message. Thanks      Ovidio Javed, BSN RN  [facility]

## 2023-03-22 ENCOUNTER — OFFICE VISIT (OUTPATIENT)
Dept: FAMILY MEDICINE | Facility: CLINIC | Age: 38
End: 2023-03-22
Payer: COMMERCIAL

## 2023-03-22 VITALS
DIASTOLIC BLOOD PRESSURE: 80 MMHG | BODY MASS INDEX: 23.58 KG/M2 | TEMPERATURE: 97.9 F | HEART RATE: 75 BPM | SYSTOLIC BLOOD PRESSURE: 128 MMHG | RESPIRATION RATE: 16 BRPM | OXYGEN SATURATION: 97 % | HEIGHT: 66 IN | WEIGHT: 146.75 LBS

## 2023-03-22 DIAGNOSIS — R89.9 ABNORMAL LABORATORY TEST RESULT: ICD-10-CM

## 2023-03-22 DIAGNOSIS — M65.352 TRIGGER LITTLE FINGER OF LEFT HAND: Primary | ICD-10-CM

## 2023-03-22 DIAGNOSIS — R20.0 HAND NUMBNESS: ICD-10-CM

## 2023-03-22 PROCEDURE — 99213 OFFICE O/P EST LOW 20 MIN: CPT | Performed by: FAMILY MEDICINE

## 2023-03-22 ASSESSMENT — PATIENT HEALTH QUESTIONNAIRE - PHQ9
SUM OF ALL RESPONSES TO PHQ QUESTIONS 1-9: 0
10. IF YOU CHECKED OFF ANY PROBLEMS, HOW DIFFICULT HAVE THESE PROBLEMS MADE IT FOR YOU TO DO YOUR WORK, TAKE CARE OF THINGS AT HOME, OR GET ALONG WITH OTHER PEOPLE: NOT DIFFICULT AT ALL
SUM OF ALL RESPONSES TO PHQ QUESTIONS 1-9: 0

## 2023-03-22 NOTE — PROGRESS NOTES
Assessment & Plan     Trigger little finger of left hand  Hand numbness  Patient was referred to hand surgery for evaluation 2 months ago but he never got a call about this.  I am having him meet with specialty scheduling today to get him scheduled.    Abnormal laboratory test result  We reviewed labs from last visit, some which were screening labs and some more done for numbness in his hand.  There was no cause found for the numbness in his hand (normal TSH, B12, CBC).  His A1c was just barely elevated in the prediabetic range at 5.7.  Cholesterol was mildly elevated with an LDL of 140.  We discussed that he may be at risk of developing diabetes or more significant hyperlipidemia in the future and so lifestyle recommendations are in order at this time.  Continue to follow-up.                   Perla Negron MD  [facility] FEMI Calix is a 37 year old, presenting for the following health issues:  lt hand pain    Additional Questions 3/22/2023   Roomed by ei     Forms 3/22/2023   Any forms needing to be completed Yes     History of Present Illness       Reason for visit:  Lt hand pain  Symptom onset:  More than a month  Symptoms include:  Numbness  Symptom intensity:  Moderate  Symptom progression:  Staying the same  Had these symptoms before:  No  What makes it better:  Brace    He eats 2-3 servings of fruits and vegetables daily.He consumes 0 sweetened beverage(s) daily.He exercises with enough effort to increase his heart rate 9 or less minutes per day.  He exercises with enough effort to increase his heart rate 3 or less days per week.   He is taking medications regularly.    Today's PHQ-9         PHQ-9 Total Score: 0    PHQ-9 Q9 Thoughts of better off dead/self-harm past 2 weeks :   Not at all    How difficult have these problems made it for you to do your work, take care of things at home, or get along with other people: Not difficult at all     Saw another doctor 1/23/23  "for hands and arm sx and here for f.u on that.    Hx reviewed with pt and via chart review:  Dx'd with Right elbow lateral epicondylitis and left 5th finger trigger finger.  Rx'd ibuprofen, sent ortho.  Also had numbness, so labs were checked.    Elbow is fine now, but left hand sx still same  Fine during day generally, but bothersome at night: Fingers get sort of numb and tingly in the fifth finger gets sort of stiff or stuck    Right-handed.                Review of Systems         Objective    /80   Pulse 75   Temp 97.9  F (36.6  C) (Oral)   Resp 16   Ht 1.681 m (5' 6.2\")   Wt 66.6 kg (146 lb 12 oz)   SpO2 97%   BMI 23.54 kg/m    Body mass index is 23.54 kg/m .  Physical Exam   General: Regular alert no acute distress  Extremities/skin/neuro: Left hand is remarkable to gross inspection without signs of infection or deformity.  Seems to have normal distal sensation of the fingers on the left.  Normal gross strength.  There is some subtle snapping of the left fifth finger upon extension.              "

## 2023-03-22 NOTE — TELEPHONE ENCOUNTER
DIAGNOSIS: left 5th finger trigger finger   APPOINTMENT DATE: 3.23.23   NOTES STATUS DETAILS   OFFICE NOTE from referring provider Internal 1.23.23 Katalina De La Cruz MD   MEDICATION LIST Care Everywhere    XRAYS (IMAGES & REPORTS) Internal 1.23.23 L hand

## 2023-03-23 ENCOUNTER — OFFICE VISIT (OUTPATIENT)
Dept: ORTHOPEDICS | Facility: CLINIC | Age: 38
End: 2023-03-23
Attending: FAMILY MEDICINE
Payer: COMMERCIAL

## 2023-03-23 ENCOUNTER — PRE VISIT (OUTPATIENT)
Dept: ORTHOPEDICS | Facility: CLINIC | Age: 38
End: 2023-03-23

## 2023-03-23 DIAGNOSIS — G56.02 CARPAL TUNNEL SYNDROME OF LEFT WRIST: Primary | ICD-10-CM

## 2023-03-23 DIAGNOSIS — M65.352 TRIGGER LITTLE FINGER OF LEFT HAND: ICD-10-CM

## 2023-03-23 PROCEDURE — 99204 OFFICE O/P NEW MOD 45 MIN: CPT | Performed by: STUDENT IN AN ORGANIZED HEALTH CARE EDUCATION/TRAINING PROGRAM

## 2023-03-23 RX ORDER — NAPROXEN 500 MG/1
500 TABLET ORAL 2 TIMES DAILY WITH MEALS
Qty: 20 TABLET | Refills: 0 | Status: SHIPPED | OUTPATIENT
Start: 2023-03-23 | End: 2023-04-02

## 2023-03-23 NOTE — LETTER
3/23/2023      RE: Yogi Sol  [address] [place] [address]     Dear Colleague,    Thank you for referring your patient, Yogi Sol, to the [facility] [place]. Please see a copy of my visit note below.    [facility]  Sports Medicine Clinic  Clinics and Surgery Center           SUBJECTIVE       Yogi Sol is a 37 year old male presenting to clinic today with left hand pain.    Sports Medicine Clinic Visit    PCP: Perla Negron    Yogi Sol is a 37 year old male who is seen  in consultation at the request of Dr. De La Cruz presenting with left small finger pain    Injury: No mechanism of injury     Location of Pain: left hand;   Duration of Pain: 4 month(s)  Rating of Pain: 3/10  Pain is better with:  Massage and heat   Pain is worse with: First thing in the morning and after work  Additional Features: Numbness and tingling in fingers 2-5  Treatment so far consists of: Massage and heat   Prior History of related problems: None     There were no vitals taken for this visit.      PMH, Medications and Allergies were reviewed and updated as needed.    ROS:  As noted above otherwise negative.    Patient Active Problem List   Diagnosis     Allergic Rhinitis     Insomnia     Major Depression, Single Episode     Lower Back Pain     Costochondritis (Tietze's Syndrome)       No current outpatient medications on file.            OBJECTIVE:       Vitals: There were no vitals filed for this visit.  BMI: There is no height or weight on file to calculate BMI.    Gen:  Well nourished and in no acute distress  HEENT: Extraocular movement intact  Neck: Supple  Pulm:  Breathing Comfortably. No increased respiratory effort.  Psych: Euthymic. Appropriately answers questions    MSK: Hand without evidence of overlying effusions or edema.  Mild sensory deficit noted in the first 2-1/2 digits on the palmar side with compression of the transverse carpal ligament.  Full wrist and elbow  range of motion.  Negative Tinel at wrist and Phalen at the wrist.  Negative Tinel at the elbow.  Palpation of the little finger palmar crease is not show any evidence of hypertrophy nodule, there is also no evidence of locking or catching.   strength, finger abduction, pincer  full and intact at 5/5.         Study Result    Narrative & Impression   EXAM: XR HAND LEFT G/E 3 VIEWS  LOCATION: [facility]  DATE/TIME: 1/23/2023 2:19 PM     INDICATION:  Trigger little finger of left hand  COMPARISON: None.                                                                      IMPRESSION: Normal joint spaces and alignment. No fracture                ASSESSMENT and PLAN:     Yogi was seen today for pain.    Diagnoses and all orders for this visit:    Carpal tunnel syndrome of left wrist    Trigger little finger of left hand  -     Orthopedic  Referral    A:   37-year-old male presenting to clinic with concerns for left hand pain, locking and catching of the pinky finger, as well numbness and tingling in the palmar aspect of the first 2-1/2 digits without evidence of atrophy or constant paresthesias.  Based on my examination, and the lack of cervical or elbow concerns, the patient very well has evidence of carpal tunnel syndrome of the left wrist as well as what appears to be a trigger finger subjectively of the little finger.    P:   Have discussed both of these diagnoses at great length with the patient.  Given the fact the patient is having alternating symptoms in the day and at night, trigger finger and carpal tunnel respectively, we have intact he is a bit separately.  To help in the anti-inflammatory effect, I have provided him with naproxen 500 mg twice daily consistently for the next 7 to 10 days.  I have also provided the patient with a wrist brace that he should wear at night since the carpal tunnel symptoms are worse in the morning, an oval 8 brace that he should wear during  the day since he is having more triggering at night after getting off of work.  We will have the patient return to clinic in 2 weeks for reassessment.  If he is not had any evidence of resolution of the trigger finger, we will consider an ultrasound-guided corticosteroid injection of that first, and then can subsequently follow that up in a few weeks for a potential carpal tunnel ultrasound-guided injection.      Options for treatment and/or follow-up care were reviewed with the patient was actively involved in the decision making process. Patient verbalized understanding and was in agreement with the plan.    Kayode Benoit DO  , Sports Medicine  Department of Family Medicine and [facility]

## 2023-03-23 NOTE — PROGRESS NOTES
[facility]  Sports Medicine Clinic  Clinics and Surgery Center           SUBJECTIVE       Yogi Sol is a 37 year old male presenting to clinic today with left hand pain.    Sports Medicine Clinic Visit    PCP: Perla Negron    Yogi Sol is a 37 year old male who is seen  in consultation at the request of Dr. De La Cruz presenting with left small finger pain    Injury: No mechanism of injury     Location of Pain: left hand;   Duration of Pain: 4 month(s)  Rating of Pain: 3/10  Pain is better with:  Massage and heat   Pain is worse with: First thing in the morning and after work  Additional Features: Numbness and tingling in fingers 2-5  Treatment so far consists of: Massage and heat   Prior History of related problems: None     There were no vitals taken for this visit.      PMH, Medications and Allergies were reviewed and updated as needed.    ROS:  As noted above otherwise negative.    Patient Active Problem List   Diagnosis     Allergic Rhinitis     Insomnia     Major Depression, Single Episode     Lower Back Pain     Costochondritis (Tietze's Syndrome)       No current outpatient medications on file.            OBJECTIVE:       Vitals: There were no vitals filed for this visit.  BMI: There is no height or weight on file to calculate BMI.    Gen:  Well nourished and in no acute distress  HEENT: Extraocular movement intact  Neck: Supple  Pulm:  Breathing Comfortably. No increased respiratory effort.  Psych: Euthymic. Appropriately answers questions    MSK: Hand without evidence of overlying effusions or edema.  Mild sensory deficit noted in the first 2-1/2 digits on the palmar side with compression of the transverse carpal ligament.  Full wrist and elbow range of motion.  Negative Tinel at wrist and Phalen at the wrist.  Negative Tinel at the elbow.  Palpation of the little finger palmar crease is not show any evidence of hypertrophy nodule, there is also no evidence of locking or catching.    strength, finger abduction, pincer  full and intact at 5/5.         Study Result    Narrative & Impression   EXAM: XR HAND LEFT G/E 3 VIEWS  LOCATION: [facility]  DATE/TIME: 1/23/2023 2:19 PM     INDICATION:  Trigger little finger of left hand  COMPARISON: None.                                                                      IMPRESSION: Normal joint spaces and alignment. No fracture                ASSESSMENT and PLAN:     Yogi was seen today for pain.    Diagnoses and all orders for this visit:    Carpal tunnel syndrome of left wrist    Trigger little finger of left hand  -     Orthopedic  Referral    A:   37-year-old male presenting to clinic with concerns for left hand pain, locking and catching of the pinky finger, as well numbness and tingling in the palmar aspect of the first 2-1/2 digits without evidence of atrophy or constant paresthesias.  Based on my examination, and the lack of cervical or elbow concerns, the patient very well has evidence of carpal tunnel syndrome of the left wrist as well as what appears to be a trigger finger subjectively of the little finger.    P:   Have discussed both of these diagnoses at great length with the patient.  Given the fact the patient is having alternating symptoms in the day and at night, trigger finger and carpal tunnel respectively, we have intact he is a bit separately.  To help in the anti-inflammatory effect, I have provided him with naproxen 500 mg twice daily consistently for the next 7 to 10 days.  I have also provided the patient with a wrist brace that he should wear at night since the carpal tunnel symptoms are worse in the morning, an oval 8 brace that he should wear during the day since he is having more triggering at night after getting off of work.  We will have the patient return to clinic in 2 weeks for reassessment.  If he is not had any evidence of resolution of the trigger finger, we will consider an  ultrasound-guided corticosteroid injection of that first, and then can subsequently follow that up in a few weeks for a potential carpal tunnel ultrasound-guided injection.      Options for treatment and/or follow-up care were reviewed with the patient was actively involved in the decision making process. Patient verbalized understanding and was in agreement with the plan.    Kayode Benoit DO  , Sports Medicine  Department of Family [facility] and [facility]

## 2023-04-12 NOTE — PROGRESS NOTES
PROCEDURE ENCOUNTER    [facility]  Kayode Benoit, DO  2023     Name: Yogi Sol  MRN: [id number]  Age: 37 year old  : 1985    Referring provider: self  Diagnosis: Trigger Finger, little finger, left hand    3/23/23:   Carpal tunnel syndrome of left wrist     Trigger little finger of left hand  -     Orthopedic  Referral     A:   37-year-old male presenting to clinic with concerns for left hand pain, locking and catching of the pinky finger, as well numbness and tingling in the palmar aspect of the first 2-1/2 digits without evidence of atrophy or constant paresthesias.  Based on my examination, and the lack of cervical or elbow concerns, the patient very well has evidence of carpal tunnel syndrome of the left wrist as well as what appears to be a trigger finger subjectively of the little finger.     P:   Have discussed both of these diagnoses at great length with the patient.  Given the fact the patient is having alternating symptoms in the day and at night, trigger finger and carpal tunnel respectively, we have intact he is a bit separately.  To help in the anti-inflammatory effect, I have provided him with naproxen 500 mg twice daily consistently for the next 7 to 10 days.  I have also provided the patient with a wrist brace that he should wear at night since the carpal tunnel symptoms are worse in the morning, an oval 8 brace that he should wear during the day since he is having more triggering at night after getting off of work.  We will have the patient return to clinic in 2 weeks for reassessment.  If he is not had any evidence of resolution of the trigger finger, we will consider an ultrasound-guided corticosteroid injection of that first, and then can subsequently follow that up in a few weeks for a potential carpal tunnel ultrasound-guided injection.             Interval Hx;  Patient returns to clinic today for follow-up of the trigger finger, as well as the injection.  The  patient states that he talk to his mother, and she is not on board with him getting this injection.  He would also like to wait for now.  He does state that the brace that was provided has made the pain a little bit worse at night.  He is not having any new symptoms or symptoms alterations.  No injuries.  After long discussion with the patient I have discussed with him that I would much prefer him to be sure about doing this injection.  The overall benefits and risks have been discussed with the patient as well.  I have advised him that most people tend to do well with this injection, if they do not and they are continuing to have triggering symptoms, a referral to one of our hand surgeons would be the next option.  The patient wants to wait for now.  I have offered him oral anti-inflammatories which he is on board with.  That has been sent to his pharmacy.  We have helped the patient reschedule his appointment for the injection for 4 weeks from now, to give him more time to think about if this is an option for him but he would like to pursue.  Patient can return to clinic as needed.    The  was used for the entirety of this visit.    Kayode Benoit D.O., [facility]  , Sports Medicine  Department of Family Medicine and Counts include 234 beds at the [facility]  [facility]

## 2023-04-13 ENCOUNTER — OFFICE VISIT (OUTPATIENT)
Dept: ORTHOPEDICS | Facility: CLINIC | Age: 38
End: 2023-04-13
Payer: COMMERCIAL

## 2023-04-13 VITALS — WEIGHT: 146 LBS | BODY MASS INDEX: 23.46 KG/M2 | HEIGHT: 66 IN

## 2023-04-13 DIAGNOSIS — M65.352 TRIGGER LITTLE FINGER OF LEFT HAND: Primary | ICD-10-CM

## 2023-04-13 PROCEDURE — 99214 OFFICE O/P EST MOD 30 MIN: CPT | Performed by: STUDENT IN AN ORGANIZED HEALTH CARE EDUCATION/TRAINING PROGRAM

## 2023-04-13 NOTE — LETTER
2023      RE: Yogi Sol  [address] [place] [address]     Dear Colleague,    Thank you for referring your patient, Yogi Sol, to the [facility] [place]. Please see a copy of my visit note below.    PROCEDURE ENCOUNTER    [facility]  Kayode Benoit, DO  2023     Name: Yogi Sol  MRN: [id number]  Age: 37 year old  : 1985    Referring provider: self  Diagnosis: Trigger Finger, little finger, left hand    3/23/23:   Carpal tunnel syndrome of left wrist     Trigger little finger of left hand  -     Orthopedic  Referral     A:   37-year-old male presenting to clinic with concerns for left hand pain, locking and catching of the pinky finger, as well numbness and tingling in the palmar aspect of the first 2-1/2 digits without evidence of atrophy or constant paresthesias.  Based on my examination, and the lack of cervical or elbow concerns, the patient very well has evidence of carpal tunnel syndrome of the left wrist as well as what appears to be a trigger finger subjectively of the little finger.     P:   Have discussed both of these diagnoses at great length with the patient.  Given the fact the patient is having alternating symptoms in the day and at night, trigger finger and carpal tunnel respectively, we have intact he is a bit separately.  To help in the anti-inflammatory effect, I have provided him with naproxen 500 mg twice daily consistently for the next 7 to 10 days.  I have also provided the patient with a wrist brace that he should wear at night since the carpal tunnel symptoms are worse in the morning, an oval 8 brace that he should wear during the day since he is having more triggering at night after getting off of work.  We will have the patient return to clinic in 2 weeks for reassessment.  If he is not had any evidence of resolution of the trigger finger, we will consider an ultrasound-guided corticosteroid injection of that  first, and then can subsequently follow that up in a few weeks for a potential carpal tunnel ultrasound-guided injection.             Interval Hx;  Patient returns to clinic today for follow-up of the trigger finger, as well as the injection.  The patient states that he talk to his mother, and she is not on board with him getting this injection.  He would also like to wait for now.  He does state that the brace that was provided has made the pain a little bit worse at night.  He is not having any new symptoms or symptoms alterations.  No injuries.  After long discussion with the patient I have discussed with him that I would much prefer him to be sure about doing this injection.  The overall benefits and risks have been discussed with the patient as well.  I have advised him that most people tend to do well with this injection, if they do not and they are continuing to have triggering symptoms, a referral to one of our hand surgeons would be the next option.  The patient wants to wait for now.  I have offered him oral anti-inflammatories which he is on board with.  That has been sent to his pharmacy.  We have helped the patient reschedule his appointment for the injection for 4 weeks from now, to give him more time to think about if this is an option for him but he would like to pursue.  Patient can return to clinic as needed.    The  was used for the entirety of this visit.    Kayode Benoit D.O., [facility]  , Sports Medicine  Department of Family Medicine and [facility]        Again, thank you for allowing me to participate in the care of your patient.      Sincerely,    Kayode Benoit, DO

## 2023-05-24 NOTE — PROGRESS NOTES
PROCEDURE ENCOUNTER    [facility]  Kayode Benoit, DO  May 24, 2023     Name: Yogi Sol  MRN: [id number]  Age: 37 year old  : 1985    Referring provider:   Diagnosis: Trigger Finger / CTS    23: Interval Hx;  Patient returns to clinic today for follow-up of the trigger finger, as well as the injection.  The patient states that he talk to his mother, and she is not on board with him getting this injection.  He would also like to wait for now.  He does state that the brace that was provided has made the pain a little bit worse at night.  He is not having any new symptoms or symptoms alterations.  No injuries.  After long discussion with the patient I have discussed with him that I would much prefer him to be sure about doing this injection.  The overall benefits and risks have been discussed with the patient as well.  I have advised him that most people tend to do well with this injection, if they do not and they are continuing to have triggering symptoms, a referral to one of our hand surgeons would be the next option.  The patient wants to wait for now.  I have offered him oral anti-inflammatories which he is on board with.  That has been sent to his pharmacy.  We have helped the patient reschedule his appointment for the injection for 4 weeks from now, to give him more time to think about if this is an option for him but he would like to pursue.  Patient can return to clinic as needed.        3/23/23:   Carpal tunnel syndrome of left wrist     Trigger little finger of left hand  -     Orthopedic  Referral     A:   37-year-old male presenting to clinic with concerns for left hand pain, locking and catching of the pinky finger, as well numbness and tingling in the palmar aspect of the first 2-1/2 digits without evidence of atrophy or constant paresthesias.  Based on my examination, and the lack of cervical or elbow concerns, the patient very well has evidence of carpal tunnel syndrome of  the left wrist as well as what appears to be a trigger finger subjectively of the little finger.     P:   Have discussed both of these diagnoses at great length with the patient.  Given the fact the patient is having alternating symptoms in the day and at night, trigger finger and carpal tunnel respectively, we have intact he is a bit separately.  To help in the anti-inflammatory effect, I have provided him with naproxen 500 mg twice daily consistently for the next 7 to 10 days.  I have also provided the patient with a wrist brace that he should wear at night since the carpal tunnel symptoms are worse in the morning, an oval 8 brace that he should wear during the day since he is having more triggering at night after getting off of work.  We will have the patient return to clinic in 2 weeks for reassessment.  If he is not had any evidence of resolution of the trigger finger, we will consider an ultrasound-guided corticosteroid injection of that first, and then can subsequently follow that up in a few weeks for a potential carpal tunnel ultrasound-guided injection.                Interval history: Patient and his partner return to clinic today to discuss the left hand.  Still having pain in the hand.  Patient and wife are unsure about doing an injection.  He has been working, states that some of his coworkers have also had pain in the hand that is similar to this that has resolved by now.  He is still having catching and locking.  Numbness and tingling have improved however.  No instability no new injuries.  Patient is unsure about doing the injection and has also been told that surgery consisted of his finger being amputated.    Assessment/plan: Long discussion with the patient again in terms of the injections.  Benefits and risk of the injection for the trigger finger has been discussed, but the patient is still unsure.  I did discuss with the patient that typically if an injection fails, the neck step would be to  send the patient to one of our hand surgeons for evaluation for removal of the nodule to decrease his symptoms.  I did tell the patient that nowhere in our medical literature to we discussed amputation of the finger, and this seems to be secondary to some poor information that the patient is receiving in the community.  The patient states that he would consider an injection if he could take a month off to allow healing.  I advised the patient that typically he would only need 3 to 5 days of lower activity and given the fact that it is just the pinky finger, a brace in that area while work should be sufficient.  The patient would like to proceed with possibly speaking with one of our hand surgeons to discuss surgical interventions.  I have placed this order.  No injections were performed today.  I did refill the patient's medication as well as discuss use of the oval 8 brace at work to prevent overuse of the pinky.  Patient can follow-up with us as needed, specifically if he decides to proceed with a an injection for the trigger finger.  Happy to see him back at any point.    [employer] was used for the entirety of this visit via telephone     Kayode Benoit D.O., CAQS  , Sports Medicine  Department of Family [facility] and [facility]

## 2023-05-25 ENCOUNTER — OFFICE VISIT (OUTPATIENT)
Dept: ORTHOPEDICS | Facility: CLINIC | Age: 38
End: 2023-05-25
Payer: COMMERCIAL

## 2023-05-25 DIAGNOSIS — G56.02 CARPAL TUNNEL SYNDROME OF LEFT WRIST: ICD-10-CM

## 2023-05-25 DIAGNOSIS — M65.352 TRIGGER LITTLE FINGER OF LEFT HAND: Primary | ICD-10-CM

## 2023-05-25 PROCEDURE — 99214 OFFICE O/P EST MOD 30 MIN: CPT | Performed by: STUDENT IN AN ORGANIZED HEALTH CARE EDUCATION/TRAINING PROGRAM

## 2023-05-25 RX ORDER — NAPROXEN 500 MG/1
500 TABLET ORAL 2 TIMES DAILY PRN
Qty: 60 TABLET | Refills: 1 | Status: SHIPPED | OUTPATIENT
Start: 2023-05-25 | End: 2023-07-24

## 2023-05-25 NOTE — LETTER
2023      RE: Yogi Sol  [address] [place] [address]     Dear Colleague,    Thank you for referring your patient, Yogi Sol, to the [facility] [place]. Please see a copy of my visit note below.    PROCEDURE ENCOUNTER    [facility]  Kayode Benoit, DO  May 24, 2023     Name: Yogi Sol  MRN: [id number]  Age: 37 year old  : 1985    Referring provider:   Diagnosis: Trigger Finger / CTS    23: Interval Hx;  Patient returns to clinic today for follow-up of the trigger finger, as well as the injection.  The patient states that he talk to his mother, and she is not on board with him getting this injection.  He would also like to wait for now.  He does state that the brace that was provided has made the pain a little bit worse at night.  He is not having any new symptoms or symptoms alterations.  No injuries.  After long discussion with the patient I have discussed with him that I would much prefer him to be sure about doing this injection.  The overall benefits and risks have been discussed with the patient as well.  I have advised him that most people tend to do well with this injection, if they do not and they are continuing to have triggering symptoms, a referral to one of our hand surgeons would be the next option.  The patient wants to wait for now.  I have offered him oral anti-inflammatories which he is on board with.  That has been sent to his pharmacy.  We have helped the patient reschedule his appointment for the injection for 4 weeks from now, to give him more time to think about if this is an option for him but he would like to pursue.  Patient can return to clinic as needed.        3/23/23:   Carpal tunnel syndrome of left wrist     Trigger little finger of left hand  -     Orthopedic  Referral     A:   37-year-old male presenting to clinic with concerns for left hand pain, locking and catching of the pinky finger, as well numbness  and tingling in the palmar aspect of the first 2-1/2 digits without evidence of atrophy or constant paresthesias.  Based on my examination, and the lack of cervical or elbow concerns, the patient very well has evidence of carpal tunnel syndrome of the left wrist as well as what appears to be a trigger finger subjectively of the little finger.     P:   Have discussed both of these diagnoses at great length with the patient.  Given the fact the patient is having alternating symptoms in the day and at night, trigger finger and carpal tunnel respectively, we have intact he is a bit separately.  To help in the anti-inflammatory effect, I have provided him with naproxen 500 mg twice daily consistently for the next 7 to 10 days.  I have also provided the patient with a wrist brace that he should wear at night since the carpal tunnel symptoms are worse in the morning, an oval 8 brace that he should wear during the day since he is having more triggering at night after getting off of work.  We will have the patient return to clinic in 2 weeks for reassessment.  If he is not had any evidence of resolution of the trigger finger, we will consider an ultrasound-guided corticosteroid injection of that first, and then can subsequently follow that up in a few weeks for a potential carpal tunnel ultrasound-guided injection.                Interval history: Patient and his partner return to clinic today to discuss the left hand.  Still having pain in the hand.  Patient and wife are unsure about doing an injection.  He has been working, states that some of his coworkers have also had pain in the hand that is similar to this that has resolved by now.  He is still having catching and locking.  Numbness and tingling have improved however.  No instability no new injuries.  Patient is unsure about doing the injection and has also been told that surgery consisted of his finger being amputated.    Assessment/plan: Long discussion with the  patient again in terms of the injections.  Benefits and risk of the injection for the trigger finger has been discussed, but the patient is still unsure.  I did discuss with the patient that typically if an injection fails, the neck step would be to send the patient to one of our hand surgeons for evaluation for removal of the nodule to decrease his symptoms.  I did tell the patient that nowhere in our medical literature to we discussed amputation of the finger, and this seems to be secondary to some poor information that the patient is receiving in the community.  The patient states that he would consider an injection if he could take a month off to allow healing.  I advised the patient that typically he would only need 3 to 5 days of lower activity and given the fact that it is just the pinky finger, a brace in that area while work should be sufficient.  The patient would like to proceed with possibly speaking with one of our hand surgeons to discuss surgical interventions.  I have placed this order.  No injections were performed today.  I did refill the patient's medication as well as discuss use of the oval 8 brace at work to prevent overuse of the pinky.  Patient can follow-up with us as needed, specifically if he decides to proceed with a an injection for the trigger finger.  Happy to see him back at any point.    [employer] was used for the entirety of this visit via telephone     Kayode Benoit D.O., [facility]  , Sports Medicine  Department of Family Medicine and [facility]        Again, thank you for allowing me to participate in the care of your patient.      Sincerely,    Kayode Benoit, DO

## 2023-05-27 NOTE — TELEPHONE ENCOUNTER
DIAGNOSIS: Left Hand/Wrist   APPOINTMENT DATE: 06/19/2023   NOTES STATUS DETAILS   [facility]   MEDICATION LIST Internal    INJECTIONS DONE IN RADIOLOGY Internal    XRAYS (IMAGES & REPORTS) Internal

## 2023-06-19 ENCOUNTER — PRE VISIT (OUTPATIENT)
Dept: ORTHOPEDICS | Facility: CLINIC | Age: 38
End: 2023-06-19

## 2023-07-27 ENCOUNTER — APPOINTMENT (OUTPATIENT)
Dept: INTERPRETER SERVICES | Facility: CLINIC | Age: 38
End: 2023-07-27
Payer: COMMERCIAL

## 2023-07-27 ENCOUNTER — PATIENT OUTREACH (OUTPATIENT)
Dept: CARE COORDINATION | Facility: CLINIC | Age: 38
End: 2023-07-27
Payer: COMMERCIAL

## 2023-07-27 NOTE — PROGRESS NOTES
Clinic Care Coordination Contact  Program:  [facility]: [place]  Renewal: [employer]   Date Applied:     [facility] Outreach:   7/27/23: 1st outreach attempt. Left a message on voicemail with call back information and requested return call.  Plan: CTA will call again within 2 weeks.  Unable to LM voicemail is full.   Gwen Knapp  Care   [facility]  Clinic Care Coordination  [phone]      Health Insurance:      Referral/Screening:

## 2023-08-08 ENCOUNTER — PATIENT OUTREACH (OUTPATIENT)
Dept: CARE COORDINATION | Facility: CLINIC | Age: 38
End: 2023-08-08
Payer: COMMERCIAL

## 2023-08-08 ENCOUNTER — APPOINTMENT (OUTPATIENT)
Dept: INTERPRETER SERVICES | Facility: CLINIC | Age: 38
End: 2023-08-08
Payer: COMMERCIAL

## 2023-08-08 NOTE — PROGRESS NOTES
Clinic Care Coordination Contact  Program:  County: [place]  Renewal: [employer]   Date Applied:     FRW Outreach:   8/8/23: 2nd outreach attempt. Left message on voicemail indicating last outreach attempt. CTA left [facility] number for renewal follow up.  Plan: CTA will no longer make outreach   CTA  unable to LM voicemail is not setup.   Gwen Knapp  Care   [facility] Care Coordination  [phone]    7/27/23: 1st outreach attempt. Left a message on voicemail with call back information and requested return call.  Plan: CTA will call again within 2 weeks.  Unable to LM voicemail is full.   Gwen Alvarado   [facility] Care Coordination  [phone]      Health Insurance:      Referral/Screening:

## 2024-04-19 NOTE — PROGRESS NOTES
Assessment & Plan     Screening for hyperlipidemia  - Lipid panel reflex to direct LDL Non-fasting    Right lateral epicondylitis  Right elbow/forearm symptoms consistent on history and exam with lateral epicondylitis.  Trial of ibuprofen 3 times daily for a week as below, physical therapy if not resolved with this.  I suspect this is exacerbated by what he is doing at work  - ibuprofen (ADVIL/MOTRIN) 600 MG tablet  Dispense: 21 tablet; Refill: 0  - Physical Therapy Referral    Trigger little finger of left hand  By history, trigger finger.  Hand x-ray today and referral to orthopedics as this is quite bothersome to patient  - XR Hand Left G/E 3 Views  - Orthopedic  Referral    Hand numbness  Patient reports all of the fingers are affected, not specifically medial or ulnar nerve distribution.  Initial work-up with labs as below, consider additional testing with EMG if lab work is unremarkable.  - TSH with free T4 reflex  - Vitamin B12  - CBC with platelets and differential  - Hemoglobin A1c    Patient declines referral to therapist or new medication today for depression.  PHQ-9 reviewed today.  Has been feeling like this a long time.  No suicidal ideation.  Advised patient follow-up for multiple issues above as well as mood in the next 2 to 4 weeks                 No follow-ups on file.    Katalina De La Cruz MD  [facility] FEMI Calix is a 37 year old, presenting for the following health issues:  Hand Pain (Pain on bilateral hands ) and Arm Pain (Right arm pain x1 month )  Both hands numb/painful - all fingers, at least a month. Gets worse at night after sleeping, but never really goes away?  No neck pain or injury.  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 Did not help. He thinks he was on a medication for mood, did not help. No previous rxs through our clinic and patient does not recall details.     Weight from 160-150 in past month per patient, eating less, does not have time to eat due to work    History of Present Illness       Reason for visit:  Work too much my hand get garcia    He eats 0-1 servings of fruits and vegetables daily.He consumes 0 sweetened beverage(s) daily.   He is taking medications regularly.    Today's PHQ-9         PHQ-9 Total Score: 21    PHQ-9 Q9 Thoughts of better off dead/self-harm past 2 weeks :   Not at all    How difficult have these problems made it for you to do your work, take care of things at home, or get along with other people: Very difficult             Review of Systems         Objective    /68   Pulse 66   Temp 98  F (36.7  C) (Oral)   Resp 16   Ht 1.681 m (5' 6.2\")   Wt 68.2 kg (150 lb 4 oz)   SpO2 98%   BMI 24.10 kg/m    Body mass index is 24.1 kg/m .  Physical Exam   Gen: NAD  MSK: pain w palpation at right lateral epicondyle, resisted wrist extension worsens pain  Neuro: normal light touch sensation hands and arms, biceps and brachioradialis reflexes 2+ symmetric  Psychiatric: Presents on time and well-groomed.  Normal speech and thought content.  Good eye contact.  Full affect.  Denies suicidal ideation                      " No assistance needed

## 2025-07-15 ENCOUNTER — LAB REQUISITION (OUTPATIENT)
Dept: LAB | Facility: CLINIC | Age: 40
End: 2025-07-15

## 2025-07-15 DIAGNOSIS — E78.5 HYPERLIPIDEMIA, UNSPECIFIED: ICD-10-CM

## 2025-07-15 LAB
ALBUMIN SERPL BCG-MCNC: 4.4 G/DL (ref 3.5–5.2)
ALP SERPL-CCNC: 59 U/L (ref 40–150)
ALT SERPL W P-5'-P-CCNC: 23 U/L (ref 0–70)
ANION GAP SERPL CALCULATED.3IONS-SCNC: 10 MMOL/L (ref 7–15)
AST SERPL W P-5'-P-CCNC: 25 U/L (ref 0–45)
BILIRUB SERPL-MCNC: 1.2 MG/DL
BUN SERPL-MCNC: 13.6 MG/DL (ref 6–20)
CALCIUM SERPL-MCNC: 9.1 MG/DL (ref 8.8–10.4)
CHLORIDE SERPL-SCNC: 105 MMOL/L (ref 98–107)
CHOLEST SERPL-MCNC: 171 MG/DL
CREAT SERPL-MCNC: 1.1 MG/DL (ref 0.67–1.17)
EGFRCR SERPLBLD CKD-EPI 2021: 88 ML/MIN/1.73M2
FASTING STATUS PATIENT QL REPORTED: YES
FASTING STATUS PATIENT QL REPORTED: YES
GLUCOSE SERPL-MCNC: 107 MG/DL (ref 70–99)
HCO3 SERPL-SCNC: 26 MMOL/L (ref 22–29)
HDLC SERPL-MCNC: 61 MG/DL
LDLC SERPL CALC-MCNC: 98 MG/DL
NONHDLC SERPL-MCNC: 110 MG/DL
POTASSIUM SERPL-SCNC: 3.9 MMOL/L (ref 3.4–5.3)
PROT SERPL-MCNC: 6.8 G/DL (ref 6.4–8.3)
SODIUM SERPL-SCNC: 141 MMOL/L (ref 135–145)
TRIGL SERPL-MCNC: 59 MG/DL

## 2025-07-15 PROCEDURE — 80061 LIPID PANEL: CPT | Performed by: NURSE PRACTITIONER

## 2025-07-15 PROCEDURE — 82310 ASSAY OF CALCIUM: CPT | Performed by: NURSE PRACTITIONER
